# Patient Record
Sex: FEMALE | URBAN - METROPOLITAN AREA
[De-identification: names, ages, dates, MRNs, and addresses within clinical notes are randomized per-mention and may not be internally consistent; named-entity substitution may affect disease eponyms.]

---
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
## 2019-09-18 ENCOUNTER — IMPORTED ENCOUNTER (OUTPATIENT)
Dept: URBAN - METROPOLITAN AREA CLINIC 1 | Facility: CLINIC | Age: 19
End: 2019-09-18

## 2019-09-18 PROBLEM — H52.13: Noted: 2019-09-18

## 2019-09-18 PROCEDURE — S0621 ROUTINE OPHTHALMOLOGICAL EXA: HCPCS

## 2019-09-18 NOTE — PATIENT DISCUSSION
1. Myopia -- Rx was given for correction if indicated and requested. New CTL Trials given and Finalized CTL Rx today. Return for an appointment in 1 year for a 40/cc with Dr. Shannon Lindo.

## 2020-09-18 ENCOUNTER — IMPORTED ENCOUNTER (OUTPATIENT)
Dept: URBAN - METROPOLITAN AREA CLINIC 1 | Facility: CLINIC | Age: 20
End: 2020-09-18

## 2020-09-18 PROBLEM — H52.13: Noted: 2020-09-18

## 2020-09-18 PROCEDURE — S0621 ROUTINE OPHTHALMOLOGICAL EXA: HCPCS

## 2020-09-18 NOTE — PATIENT DISCUSSION
1. Myopia -- Rx was given for correction if indicated and requested. New CTL Trials given and Finalized CTL Rx today. Return for an appointment in 1 year for a 40/cc with Dr. Ange Adler.

## 2022-04-02 ASSESSMENT — TONOMETRY
OD_IOP_MMHG: 16
OS_IOP_MMHG: 15
OD_IOP_MMHG: 14
OS_IOP_MMHG: 14
OD_IOP_MMHG: 15
OS_IOP_MMHG: 16

## 2022-04-02 ASSESSMENT — VISUAL ACUITY
OS_SC: 20/20
OD_SC: 20/20-1
OS_CC: J1+
OS_SC: 20/20
OS_CC: J1+
OD_SC: 20/20
OD_SC: 20/20
OD_CC: J1+
OS_SC: 20/20
OS_SC: 20/30
OD_SC: 20/20-1
OD_CC: J1+
OD_SC: 20/25
OS_SC: 20/20